# Patient Record
Sex: FEMALE | Race: BLACK OR AFRICAN AMERICAN | NOT HISPANIC OR LATINO | Employment: OTHER | URBAN - METROPOLITAN AREA
[De-identification: names, ages, dates, MRNs, and addresses within clinical notes are randomized per-mention and may not be internally consistent; named-entity substitution may affect disease eponyms.]

---

## 2022-07-20 DIAGNOSIS — Z08 ENCOUNTER FOR FOLLOW-UP SURVEILLANCE OF CERVICAL CANCER: Primary | ICD-10-CM

## 2022-07-20 DIAGNOSIS — Z85.41 ENCOUNTER FOR FOLLOW-UP SURVEILLANCE OF CERVICAL CANCER: Primary | ICD-10-CM

## 2023-02-08 ENCOUNTER — PATIENT MESSAGE (OUTPATIENT)
Dept: RESEARCH | Facility: HOSPITAL | Age: 68
End: 2023-02-08
Payer: MEDICARE

## 2023-09-07 ENCOUNTER — PATIENT MESSAGE (OUTPATIENT)
Dept: RESEARCH | Facility: HOSPITAL | Age: 68
End: 2023-09-07
Payer: MEDICARE

## 2024-02-15 LAB — BCS RECOMMENDATION EXT: NORMAL

## 2024-02-19 LAB — CRC RECOMMENDATION EXT: NORMAL

## 2024-02-20 ENCOUNTER — OFFICE VISIT (OUTPATIENT)
Dept: FAMILY MEDICINE | Facility: CLINIC | Age: 69
End: 2024-02-20
Payer: MEDICARE

## 2024-02-20 VITALS
OXYGEN SATURATION: 96 % | SYSTOLIC BLOOD PRESSURE: 120 MMHG | BODY MASS INDEX: 24.36 KG/M2 | RESPIRATION RATE: 17 BRPM | TEMPERATURE: 98 F | HEART RATE: 60 BPM | HEIGHT: 67 IN | WEIGHT: 155.19 LBS | DIASTOLIC BLOOD PRESSURE: 84 MMHG

## 2024-02-20 DIAGNOSIS — I10 BENIGN ESSENTIAL HTN: ICD-10-CM

## 2024-02-20 DIAGNOSIS — Z13.29 SCREENING FOR THYROID DISORDER: ICD-10-CM

## 2024-02-20 DIAGNOSIS — F41.1 GAD (GENERALIZED ANXIETY DISORDER): ICD-10-CM

## 2024-02-20 DIAGNOSIS — R79.9 ABNORMAL FINDING OF BLOOD CHEMISTRY, UNSPECIFIED: ICD-10-CM

## 2024-02-20 DIAGNOSIS — Z76.89 ENCOUNTER TO ESTABLISH CARE: ICD-10-CM

## 2024-02-20 DIAGNOSIS — G89.29 CHRONIC LOW BACK PAIN WITHOUT SCIATICA, UNSPECIFIED BACK PAIN LATERALITY: ICD-10-CM

## 2024-02-20 DIAGNOSIS — M54.50 CHRONIC LOW BACK PAIN WITHOUT SCIATICA, UNSPECIFIED BACK PAIN LATERALITY: ICD-10-CM

## 2024-02-20 DIAGNOSIS — E78.2 MIXED HYPERLIPIDEMIA: ICD-10-CM

## 2024-02-20 DIAGNOSIS — Z85.41 HX OF CERVICAL CANCER: ICD-10-CM

## 2024-02-20 DIAGNOSIS — Z11.59 ENCOUNTER FOR HEPATITIS C SCREENING TEST FOR LOW RISK PATIENT: ICD-10-CM

## 2024-02-20 DIAGNOSIS — Z23 NEED FOR TETANUS, DIPHTHERIA, AND ACELLULAR PERTUSSIS (TDAP) VACCINE: ICD-10-CM

## 2024-02-20 DIAGNOSIS — Z78.0 MENOPAUSE: ICD-10-CM

## 2024-02-20 DIAGNOSIS — G47.9 SLEEPING DIFFICULTIES: ICD-10-CM

## 2024-02-20 DIAGNOSIS — Z23 NEED FOR VACCINATION FOR STREP PNEUMONIAE: ICD-10-CM

## 2024-02-20 PROCEDURE — 99204 OFFICE O/P NEW MOD 45 MIN: CPT | Mod: ,,, | Performed by: STUDENT IN AN ORGANIZED HEALTH CARE EDUCATION/TRAINING PROGRAM

## 2024-02-20 PROCEDURE — 1159F MED LIST DOCD IN RCRD: CPT | Mod: CPTII,,, | Performed by: STUDENT IN AN ORGANIZED HEALTH CARE EDUCATION/TRAINING PROGRAM

## 2024-02-20 PROCEDURE — 3008F BODY MASS INDEX DOCD: CPT | Mod: CPTII,,, | Performed by: STUDENT IN AN ORGANIZED HEALTH CARE EDUCATION/TRAINING PROGRAM

## 2024-02-20 PROCEDURE — 3074F SYST BP LT 130 MM HG: CPT | Mod: CPTII,,, | Performed by: STUDENT IN AN ORGANIZED HEALTH CARE EDUCATION/TRAINING PROGRAM

## 2024-02-20 PROCEDURE — 1160F RVW MEDS BY RX/DR IN RCRD: CPT | Mod: CPTII,,, | Performed by: STUDENT IN AN ORGANIZED HEALTH CARE EDUCATION/TRAINING PROGRAM

## 2024-02-20 PROCEDURE — 1125F AMNT PAIN NOTED PAIN PRSNT: CPT | Mod: CPTII,,, | Performed by: STUDENT IN AN ORGANIZED HEALTH CARE EDUCATION/TRAINING PROGRAM

## 2024-02-20 PROCEDURE — 3079F DIAST BP 80-89 MM HG: CPT | Mod: CPTII,,, | Performed by: STUDENT IN AN ORGANIZED HEALTH CARE EDUCATION/TRAINING PROGRAM

## 2024-02-20 PROCEDURE — 1101F PT FALLS ASSESS-DOCD LE1/YR: CPT | Mod: CPTII,,, | Performed by: STUDENT IN AN ORGANIZED HEALTH CARE EDUCATION/TRAINING PROGRAM

## 2024-02-20 PROCEDURE — 3288F FALL RISK ASSESSMENT DOCD: CPT | Mod: CPTII,,, | Performed by: STUDENT IN AN ORGANIZED HEALTH CARE EDUCATION/TRAINING PROGRAM

## 2024-02-20 PROCEDURE — 4010F ACE/ARB THERAPY RXD/TAKEN: CPT | Mod: CPTII,,, | Performed by: STUDENT IN AN ORGANIZED HEALTH CARE EDUCATION/TRAINING PROGRAM

## 2024-02-20 RX ORDER — ALPRAZOLAM 0.5 MG/1
0.5 TABLET ORAL
COMMUNITY
Start: 2024-02-09

## 2024-02-20 RX ORDER — LISINOPRIL 20 MG/1
20 TABLET ORAL DAILY
Qty: 90 TABLET | Refills: 0 | Status: SHIPPED | OUTPATIENT
Start: 2024-02-20 | End: 2024-05-20

## 2024-02-20 RX ORDER — ATORVASTATIN CALCIUM 20 MG/1
20 TABLET, FILM COATED ORAL DAILY
Qty: 90 TABLET | Refills: 3 | Status: SHIPPED | OUTPATIENT
Start: 2024-02-20 | End: 2025-02-19

## 2024-02-20 RX ORDER — GABAPENTIN 800 MG/1
800 TABLET ORAL DAILY
COMMUNITY

## 2024-02-20 NOTE — PROGRESS NOTES
Patient ID: [id number]     Chief Complaint: Establish Care        HPI:      Disclaimer:  This note is prepared using voice recognition software and as such is likely to have errors despite attempts at proofreading. Please contact me for questions.     Danii Powers is a 68 y.o. female here today for the following    Over all doing good. No new concerns  Is here to discuss the following issues. And Is to establish care    Acute Issues-  HTN  Asx  At goal   Currently taking Lisinopril     Insomnia  On Ambien    Neuropathy-  Chronic low back pain  On Gabapentin 800 mg daily    Vit D deficiency  Asx    JAMES  On prn Xanax   Well controlled    Cervical cancer-   Hx of Stage IIb cervical cancer in 2009 tx by chemo/radiatio   Reports to be in remission   Reports that she did not like any physician before.    Chronic Issues-    ----------------------------  Cervical cancer  HTN (hypertension)     History reviewed. No pertinent surgical history.    Review of patient's allergies indicates:  No Known Allergies    Current Outpatient Medications   Medication Instructions    ALPRAZolam (XANAX) 0.5 mg, Oral, As needed (PRN)    ergocalciferol (ERGOCALCIFEROL) 50,000 Units, Oral, Weekly    gabapentin (NEURONTIN) 800 mg, Oral, Daily    ibuprofen (ADVIL,MOTRIN) 800 mg, Oral    lisinopriL (PRINIVIL,ZESTRIL) 20 mg, Oral, Daily    meloxicam (MOBIC) 15 mg, Oral    PREMARIN 1 g, Vaginal, Daily    zolpidem (AMBIEN) 10 mg, Oral, Nightly       Social History     Socioeconomic History    Marital status: Single   Tobacco Use    Smoking status: Never     Passive exposure: Never    Smokeless tobacco: Never   Substance and Sexual Activity    Alcohol use: Not Currently    Drug use: Never    Sexual activity: Not Currently        Family History   Problem Relation Age of Onset    Cancer Mother         Patient Care Team:  Basil Johnson FNP as PCP - General (Internal Medicine)     Subjective:     ROS    See HPI for details    Constitutional: Denies  "Change in appetite. Denies Chills. Denies Fever. Denies Night sweats.  Respiratory: Denies Cough. Denies Shortness of breath. Denies Shortness of breath with exertion. Denies Wheezing.  Cardiovascular: DeniesChest pain at rest. Denies Chest pain with exertion. Denies Irregular heartbeat. Denies Palpitations. Denies Edema.  Gastrointestinal: Denies Abdominal pain. DeniesDiarrhea. Denies Nausea. Denies Vomiting. Denies Hematemesis or Hematochezia.  Genitourinary: Denies Dysuria. Denies Urinary frequency. Denies Urinary urgency. Denies Blood in urine.    All Other ROS: Negative except as stated in HPI.  Objective:     Visit Vitals  /84 (BP Location: Right arm, Patient Position: Sitting, BP Method: Large (Manual))   Pulse 60   Temp 98.2 °F (36.8 °C) (Oral)   Resp 17   Ht 5' 7" (1.702 m)   Wt 70.4 kg (155 lb 3.2 oz)   SpO2 96%   BMI 24.31 kg/m²       Physical Exam    General: Alert and oriented, No acute distress.  Respiratory: Clear to auscultation bilaterally; No wheezes, rales or rhonchi,Non-labored respirations, Symmetrical chest wall expansion.  Cardiovascular: Regular rate and rhythm, S1/S2 normal, No murmurs, rubs or gallops.  Gastrointestinal: Soft, Non-tender, Non-distended, Normal bowel sounds, No palpable organomegaly.  Musculoskeletal: Normal range of motion.  Extremities: No clubbing, cyanosis or edema  Neurologic: No focal deficits  Psychiatric: Normal interaction, Coherent speech, Euthymic mood, Appropriate affect   Assessment:       ICD-10-CM ICD-9-CM   1. Encounter to establish care  Z76.89 V65.8   2. Benign essential HTN  I10 401.1   3. Mixed hyperlipidemia  E78.2 272.2   4. Sleeping difficulties  G47.9 780.50   5. JAMES (generalized anxiety disorder)  F41.1 300.02   6. Hx of cervical cancer  Z85.41 V10.41   7. Chronic low back pain without sciatica, unspecified back pain laterality  M54.50 724.2    G89.29 338.29   8. Screening for thyroid disorder  Z13.29 V77.0   9. Encounter for hepatitis C " screening test for low risk patient  Z11.59 V73.89   10. Abnormal finding of blood chemistry, unspecified  R79.9 790.6   11. Need for vaccination for Strep pneumoniae  Z23 V03.82   12. Need for tetanus, diphtheria, and acellular pertussis (Tdap) vaccine  Z23 V06.1   13. Menopause  Z78.0 627.2        Plan:     1. Encounter to establish care  Discuss about the blood work/screening test/immunizations   Recommend to start dash diet along with 35 minutes of brisk walking    2. Benign essential HTN  -     lisinopriL (PRINIVIL,ZESTRIL) 20 MG tablet; Take 1 tablet (20 mg total) by mouth once daily.  Dispense: 90 tablet; Refill: 0  -     CBC Auto Differential; Future; Expected date: 02/20/2024  -     Comprehensive Metabolic Panel; Future; Expected date: 02/20/2024  -     Urinalysis; Future; Expected date: 02/20/2024  -     Microalbumin/Creatinine Ratio, Urine; Future; Expected date: 02/20/2024    3. Mixed hyperlipidemia  -     Lipid Panel; Future; Expected date: 02/20/2024  Start statins as prescribed    4. Sleeping difficulties  Continue Ambien as prescribed    5. JAMES (generalized anxiety disorder)  Continue p.r.n. Xanax    6. Hx of cervical cancer  -     Ambulatory referral/consult to Obstetrics / Gynecology; Future; Expected date: 02/27/2024      7. Chronic low back pain without sciatica, unspecified back pain laterality  Continue gabapentin as prescribed  -     Hemoglobin A1C; Future; Expected date: 02/20/2024    8. Screening for thyroid disorder  -     TSH; Future; Expected date: 02/20/2024    9. Encounter for hepatitis C screening test for low risk patient  -     Hepatitis C Antibody; Future; Expected date: 02/20/2024    10. Abnormal finding of blood chemistry, unspecified  -     Hemoglobin A1C; Future; Expected date: 02/20/2024    11. Need for vaccination for Strep pneumoniae  -     pneumoc 20-delia conj-dip cr,PF, (PREVNAR-20, PF,) 0.5 mL Syrg injection; Inject 0.5 mLs into the muscle once. Please administer vaccine.  for 1 dose  Dispense: 0.5 mL; Refill: 0    12. Need for tetanus, diphtheria, and acellular pertussis (Tdap) vaccine  -     DIPH,PERTUSS,ACEL,,TET VAC,PF, ADULT (ADACEL) 2 Lf-(2.5-5-3-5 mcg)-5Lf/0.5 mL Syrg; Inject 0.5 mLs into the muscle once. Please administer vaccine. for 1 dose  Dispense: 1 each; Refill: 0    13. Menopause  -     DXA Bone Density Axial Skeleton 1 or more sites; Future; Expected date: 02/20/2024           Medication List with Changes/Refills   Current Medications    ALPRAZOLAM (XANAX) 0.5 MG TABLET    Take 0.5 mg by mouth as needed.       Start Date: 2/9/2024  End Date: --    CONJUGATED ESTROGENS (PREMARIN) VAGINAL CREAM    Place 1 g vaginally once daily.       Start Date: 12/29/2020End Date: 1/28/2021    ERGOCALCIFEROL (ERGOCALCIFEROL) 50,000 UNIT CAP    Take 50,000 Units by mouth once a week.       Start Date: 12/17/2020End Date: --    GABAPENTIN (NEURONTIN) 800 MG TABLET    Take 800 mg by mouth once daily.       Start Date: --        End Date: --    IBUPROFEN (ADVIL,MOTRIN) 800 MG TABLET    Take 800 mg by mouth.       Start Date: --        End Date: --    LISINOPRIL (PRINIVIL,ZESTRIL) 20 MG TABLET    Take 20 mg by mouth once daily.       Start Date: 12/15/2020End Date: --    MELOXICAM (MOBIC) 15 MG TABLET    Take 15 mg by mouth.       Start Date: --        End Date: --    ZOLPIDEM (AMBIEN) 5 MG TAB    Take 10 mg by mouth every evening.       Start Date: 12/15/2020End Date: --          Follow up in about 4 weeks (around 3/19/2024) for Medicare Wellness.   In addition to their scheduled follow up, the patient has also been instructed to follow up on as needed basis.

## 2024-02-26 ENCOUNTER — TELEPHONE (OUTPATIENT)
Dept: FAMILY MEDICINE | Facility: CLINIC | Age: 69
End: 2024-02-26
Payer: MEDICARE

## 2024-02-26 NOTE — TELEPHONE ENCOUNTER
Called pharmacy to give clarification on medication.    Representative stated patient was on hydrochlorothiazide prescribed by a different doctor, as Dr. Ceballos has her on lisinopril. Did not see hydrochlorothiazide in patient's chart. Told representative to just fill the lisinopril due to other medication not being in her chart. She voiced her understanding.

## 2024-02-26 NOTE — TELEPHONE ENCOUNTER
----- Message from Norah Garcia sent at 2/21/2024  3:04 PM CST -----  Regarding: phar  .Type:  Pharmacy Calling to Clarify an RX    Name of Caller:  Pharmacy Name:[id number] - [place] - [address] AT [facility] [place] [address]   Phone: [phone]  Fax: [phone]  Prescription Name:  What do they need to clarify?:  Best Call Back Number:  Additional Information: pt is already prescribed lisinopriL (PRINIVIL,ZESTRIL) 20 MG tablet

## 2024-03-04 ENCOUNTER — DOCUMENTATION ONLY (OUTPATIENT)
Dept: FAMILY MEDICINE | Facility: CLINIC | Age: 69
End: 2024-03-04
Payer: MEDICARE

## 2024-03-04 ENCOUNTER — TELEPHONE (OUTPATIENT)
Dept: FAMILY MEDICINE | Facility: CLINIC | Age: 69
End: 2024-03-04
Payer: MEDICARE

## 2024-03-04 NOTE — TELEPHONE ENCOUNTER
----- Message from Sanam Ceballos MD sent at 3/4/2024 10:05 AM CST -----  Normal MMG. Repeat in 1 year.

## 2024-03-05 ENCOUNTER — DOCUMENTATION ONLY (OUTPATIENT)
Dept: FAMILY MEDICINE | Facility: CLINIC | Age: 69
End: 2024-03-05
Payer: MEDICARE

## 2024-03-18 ENCOUNTER — TELEPHONE (OUTPATIENT)
Dept: FAMILY MEDICINE | Facility: CLINIC | Age: 69
End: 2024-03-18
Payer: MEDICARE

## 2024-03-18 NOTE — TELEPHONE ENCOUNTER
Attempted to call patient in regards to appointment on 3/21/2024 for Medicare wellness.    Could not contact.